# Patient Record
Sex: MALE | Race: WHITE | NOT HISPANIC OR LATINO | Employment: FULL TIME | URBAN - METROPOLITAN AREA
[De-identification: names, ages, dates, MRNs, and addresses within clinical notes are randomized per-mention and may not be internally consistent; named-entity substitution may affect disease eponyms.]

---

## 2018-02-08 PROBLEM — M67.911 ROTATOR CUFF DISORDER, RIGHT: Status: ACTIVE | Noted: 2018-02-08

## 2018-02-08 PROBLEM — Z87.828 HISTORY OF MENISCAL TEAR: Status: ACTIVE | Noted: 2018-02-08

## 2018-02-28 PROBLEM — N20.0 KIDNEY STONES: Status: ACTIVE | Noted: 2018-02-28

## 2018-04-17 PROBLEM — M75.01 ADHESIVE CAPSULITIS OF RIGHT SHOULDER: Status: ACTIVE | Noted: 2018-04-17

## 2018-05-08 PROBLEM — M54.12 CERVICAL RADICULITIS: Status: ACTIVE | Noted: 2018-05-08

## 2019-07-22 PROBLEM — M51.9 LUMBAR DISC DISEASE: Status: ACTIVE | Noted: 2019-07-22

## 2019-08-28 PROBLEM — R10.13 EPIGASTRIC PAIN: Status: ACTIVE | Noted: 2019-08-28

## 2019-10-03 PROBLEM — K21.9 GASTROESOPHAGEAL REFLUX DISEASE WITHOUT ESOPHAGITIS: Status: ACTIVE | Noted: 2019-10-03

## 2019-11-19 PROBLEM — Z72.0 CHEWS TOBACCO: Status: ACTIVE | Noted: 2019-11-19

## 2020-01-16 PROBLEM — K27.9 PUD (PEPTIC ULCER DISEASE): Status: ACTIVE | Noted: 2020-01-16

## 2020-01-16 PROBLEM — F41.9 ANXIETY: Status: ACTIVE | Noted: 2020-01-16

## 2020-03-16 PROBLEM — Z87.19 S/P DILATATION OF ESOPHAGEAL STRICTURE: Status: ACTIVE | Noted: 2020-03-16

## 2020-03-16 PROBLEM — Z98.890 S/P DILATATION OF ESOPHAGEAL STRICTURE: Status: ACTIVE | Noted: 2020-03-16

## 2020-04-16 PROBLEM — R63.4 WEIGHT LOSS: Status: ACTIVE | Noted: 2019-12-30

## 2020-04-16 PROBLEM — R68.81 EARLY SATIETY: Status: ACTIVE | Noted: 2019-12-30

## 2020-09-03 PROBLEM — F32.1 CURRENT MODERATE EPISODE OF MAJOR DEPRESSIVE DISORDER WITHOUT PRIOR EPISODE: Status: ACTIVE | Noted: 2020-09-03

## 2020-10-19 ENCOUNTER — TELEPHONE (OUTPATIENT)
Dept: ORTHOPEDICS | Facility: CLINIC | Age: 40
End: 2020-10-19

## 2020-10-22 ENCOUNTER — TELEPHONE (OUTPATIENT)
Dept: NEUROLOGY | Facility: CLINIC | Age: 40
End: 2020-10-22

## 2020-10-22 NOTE — TELEPHONE ENCOUNTER
----- Message from Ainsley Self MA sent at 10/21/2020  9:24 AM CDT -----  [facility]         Can ya'll assist with scheduling? Concussion     Thank you!   Berkley Piedra MS, OTC   Clinical Assistant to Dr. Bateman

## 2020-10-22 NOTE — TELEPHONE ENCOUNTER
----- Message from Ibis Gaspar sent at 10/22/2020 11:40 AM CDT -----  Contact: Pt @ [phone]  Pt calling in reference to missed call to schedule an appt as new patient for workers comp.

## 2021-05-12 ENCOUNTER — PATIENT MESSAGE (OUTPATIENT)
Dept: RESEARCH | Facility: HOSPITAL | Age: 41
End: 2021-05-12

## 2021-08-27 PROBLEM — M70.62 TROCHANTERIC BURSITIS OF LEFT HIP: Status: ACTIVE | Noted: 2021-08-27

## 2021-08-27 PROBLEM — G44.309 POST-CONCUSSION HEADACHE: Status: ACTIVE | Noted: 2021-08-27

## 2022-01-06 PROBLEM — U07.1 COVID-19 VIRUS INFECTION: Status: ACTIVE | Noted: 2022-01-06

## 2022-01-11 PROBLEM — M54.12 CERVICAL RADICULITIS: Status: RESOLVED | Noted: 2018-05-08 | Resolved: 2022-01-11

## 2022-01-11 PROBLEM — N20.0 KIDNEY STONES: Status: RESOLVED | Noted: 2018-02-28 | Resolved: 2022-01-11

## 2022-01-11 PROBLEM — K29.00 ACUTE SUPERFICIAL GASTRITIS WITHOUT HEMORRHAGE: Status: ACTIVE | Noted: 2021-04-19

## 2022-01-11 PROBLEM — M75.01 ADHESIVE CAPSULITIS OF RIGHT SHOULDER: Status: RESOLVED | Noted: 2018-04-17 | Resolved: 2022-01-11

## 2022-01-11 PROBLEM — Z72.0 CHEWS TOBACCO: Status: RESOLVED | Noted: 2019-11-19 | Resolved: 2022-01-11

## 2022-01-11 PROBLEM — M67.911 ROTATOR CUFF DISORDER, RIGHT: Status: RESOLVED | Noted: 2018-02-08 | Resolved: 2022-01-11

## 2022-01-11 PROBLEM — K29.00 ACUTE SUPERFICIAL GASTRITIS WITHOUT HEMORRHAGE: Status: RESOLVED | Noted: 2021-04-19 | Resolved: 2022-01-11

## 2022-01-11 PROBLEM — R68.81 EARLY SATIETY: Status: RESOLVED | Noted: 2019-12-30 | Resolved: 2022-01-11

## 2022-03-05 PROBLEM — R29.90 NEUROLOGICAL COMPLAINT: Status: ACTIVE | Noted: 2022-03-05

## 2022-03-05 PROBLEM — S06.9XAA TBI (TRAUMATIC BRAIN INJURY): Status: ACTIVE | Noted: 2022-03-05

## 2022-03-05 PROBLEM — F32.A DEPRESSION: Status: ACTIVE | Noted: 2022-03-05

## 2022-03-07 DIAGNOSIS — R55 SYNCOPE AND COLLAPSE: Primary | ICD-10-CM

## 2022-03-08 ENCOUNTER — TELEPHONE (OUTPATIENT)
Dept: CARDIOLOGY | Facility: CLINIC | Age: 42
End: 2022-03-08
Payer: MEDICAID

## 2022-03-21 ENCOUNTER — TELEPHONE (OUTPATIENT)
Dept: CARDIOLOGY | Facility: CLINIC | Age: 42
End: 2022-03-21
Payer: MEDICAID

## 2022-03-21 NOTE — TELEPHONE ENCOUNTER
----- Message from Renetta Hollins sent at 3/21/2022 12:01 PM CDT -----  Regarding: Call back  Contact: [phone]  Type:  Patient Call Back    Who Called:pt  What this is regarding?:reschedule appt for ekg  Would the patient rather a call back or a response via [facility]? Call back  Best Call Back Number:[phone]  Additional Information:     Advised to call back directly if there are further questions, or if these symptoms fail to improve as anticipated or worsen.

## 2022-05-23 ENCOUNTER — TELEPHONE (OUTPATIENT)
Dept: NEUROSURGERY | Facility: CLINIC | Age: 42
End: 2022-05-23
Payer: MEDICAID

## 2022-05-23 NOTE — TELEPHONE ENCOUNTER
Called patient to schedule NP appointment with Dr. Hinojosa per referral from Nader Natarajan II, MD.  No answer.  Left voicemail.

## 2022-09-24 ENCOUNTER — HOSPITAL ENCOUNTER (OUTPATIENT)
Dept: RADIOLOGY | Facility: CLINIC | Age: 42
Discharge: HOME | End: 2022-09-24
Payer: MEDICAID

## 2022-09-24 ENCOUNTER — OFFICE VISIT (OUTPATIENT)
Dept: URGENT CARE | Facility: CLINIC | Age: 42
End: 2022-09-24
Payer: MEDICAID

## 2022-09-24 VITALS
SYSTOLIC BLOOD PRESSURE: 124 MMHG | RESPIRATION RATE: 20 BRPM | OXYGEN SATURATION: 99 % | WEIGHT: 173 LBS | DIASTOLIC BLOOD PRESSURE: 83 MMHG | TEMPERATURE: 96 F | BODY MASS INDEX: 27.8 KG/M2 | HEART RATE: 68 BPM | HEIGHT: 66 IN

## 2022-09-24 DIAGNOSIS — S60.511A ABRASION OF RIGHT HAND, INITIAL ENCOUNTER: Primary | ICD-10-CM

## 2022-09-24 DIAGNOSIS — S60.511A ABRASION OF RIGHT HAND, INITIAL ENCOUNTER: ICD-10-CM

## 2022-09-24 PROCEDURE — 1159F PR MEDICATION LIST DOCUMENTED IN MEDICAL RECORD: ICD-10-PCS | Mod: CPTII,S$GLB,,

## 2022-09-24 PROCEDURE — 4010F PR ACE/ARB THEARPY RXD/TAKEN: ICD-10-PCS | Mod: CPTII,S$GLB,,

## 2022-09-24 PROCEDURE — 73130 X-RAY EXAM OF HAND: CPT | Mod: RT,S$GLB,, | Performed by: RADIOLOGY

## 2022-09-24 PROCEDURE — 99214 OFFICE O/P EST MOD 30 MIN: CPT | Mod: S$GLB,,,

## 2022-09-24 PROCEDURE — 3008F PR BODY MASS INDEX (BMI) DOCUMENTED: ICD-10-PCS | Mod: CPTII,S$GLB,,

## 2022-09-24 PROCEDURE — 3074F PR MOST RECENT SYSTOLIC BLOOD PRESSURE < 130 MM HG: ICD-10-PCS | Mod: CPTII,S$GLB,,

## 2022-09-24 PROCEDURE — 3074F SYST BP LT 130 MM HG: CPT | Mod: CPTII,S$GLB,,

## 2022-09-24 PROCEDURE — 3079F PR MOST RECENT DIASTOLIC BLOOD PRESSURE 80-89 MM HG: ICD-10-PCS | Mod: CPTII,S$GLB,,

## 2022-09-24 PROCEDURE — 73130 XR HAND COMPLETE 3 VIEW RIGHT: ICD-10-PCS | Mod: RT,S$GLB,, | Performed by: RADIOLOGY

## 2022-09-24 PROCEDURE — 4010F ACE/ARB THERAPY RXD/TAKEN: CPT | Mod: CPTII,S$GLB,,

## 2022-09-24 PROCEDURE — 3008F BODY MASS INDEX DOCD: CPT | Mod: CPTII,S$GLB,,

## 2022-09-24 PROCEDURE — 1159F MED LIST DOCD IN RCRD: CPT | Mod: CPTII,S$GLB,,

## 2022-09-24 PROCEDURE — 99214 PR OFFICE/OUTPT VISIT, EST, LEVL IV, 30-39 MIN: ICD-10-PCS | Mod: S$GLB,,,

## 2022-09-24 PROCEDURE — 3079F DIAST BP 80-89 MM HG: CPT | Mod: CPTII,S$GLB,,

## 2022-09-24 RX ORDER — CEPHALEXIN 500 MG/1
500 CAPSULE ORAL EVERY 12 HOURS
Qty: 14 CAPSULE | Refills: 0 | Status: SHIPPED | OUTPATIENT
Start: 2022-09-24 | End: 2022-10-01

## 2022-09-24 RX ORDER — NAPROXEN 500 MG/1
500 TABLET ORAL 2 TIMES DAILY
Qty: 10 TABLET | Refills: 0 | Status: SHIPPED | OUTPATIENT
Start: 2022-09-24 | End: 2022-09-24

## 2022-09-24 RX ORDER — TRAMADOL HYDROCHLORIDE 50 MG/1
50 TABLET ORAL EVERY 8 HOURS PRN
Qty: 9 TABLET | Refills: 0 | Status: SHIPPED | OUTPATIENT
Start: 2022-09-24 | End: 2022-09-27

## 2022-09-24 NOTE — PATIENT INSTRUCTIONS
Do not pick wound.    Take Keflex until completion and as prescribed.     Please follow up with primary care provider in 2-3 if this does not improve.     Please monitor for signs of infection include:  Increase in redness, increase in pain, purulent (pus) drainage.                                    If your condition worsens or fails to improve we recommend that you receive another evaluation at the ER immediately or contact your PCP to discuss your concerns or return here. You must understand that you've received an urgent care treatment only and that you may be released before all your medical problems are known or treated. You the patient will arrange for follouwp care as instructed.   Tylenol or ibuprofen can also be used as directed for pain unless you have an allergy to them or medical condition such as stomach ulcers, kidney or liver disease or blood thinners etc for which you should not be taking these type of medications.   
patient

## 2022-09-24 NOTE — PROGRESS NOTES
"Subjective:       Patient ID: Saul Guzman is a 42 y.o. male.    Vitals:  height is 5' 6" (1.676 m) and weight is 78.5 kg (173 lb). His temperature is 96.3 °F (35.7 °C). His blood pressure is 124/83 and his pulse is 68. His respiration is 20 and oxygen saturation is 99%.     Chief Complaint: Hand Injury    Cable wire hit on his hand and his middle finger. This happened about a week ago.  Patient reports was at home working and wire went into another right hand approximately 1 week ago.  Patient reports swelling has slight redness to site patient reports the numbness patient denies weakness tingling,    Hand Injury   His dominant hand is their right hand. The incident occurred 5 to 7 days ago. The incident occurred at work. The injury mechanism was a burn. The pain is present in the right fingers and right hand. The quality of the pain is described as stabbing. The pain does not radiate. The pain is at a severity of 10/10. The pain is severe. The pain has been Constant since the incident. Associated symptoms include numbness. Pertinent negatives include no chest pain, muscle weakness or tingling. Nothing aggravates the symptoms. He has tried acetaminophen for the symptoms.     Constitution: Negative. Negative for activity change, appetite change, chills, sweating, fatigue, fever and generalized weakness.   HENT: Negative.  Negative for ear pain.    Neck: neck negative. Negative for neck pain and painful lymph nodes.   Cardiovascular: Negative.  Negative for chest pain, leg swelling, palpitations, sob on exertion and passing out.   Respiratory: Negative.  Negative for cough and shortness of breath.    Gastrointestinal: Negative.  Negative for abdominal pain and bowel incontinence.   Musculoskeletal:  Positive for pain and trauma. Negative for joint pain, joint swelling, abnormal ROM of joint, arthritis, gout, back pain, pain with walking, muscle cramps, muscle ache and history of spine disorder.        Patient " reports right hand pain, and right middle finger pain with slight swelling redness.  Patient reported wire going into hand 1 week ago   Skin: Negative.  Negative for rash and hives.   Allergic/Immunologic: Negative for hives.   Neurological:  Positive for numbness. Negative for dizziness, light-headedness, passing out, facial drooping and altered mental status.   Hematologic/Lymphatic: Negative.  Negative for swollen lymph nodes.   Psychiatric/Behavioral: Negative.  Negative for altered mental status.      Objective:      Physical Exam   Constitutional: He is oriented to person, place, and time.  Non-toxic appearance. He does not appear ill. No distress.   HENT:   Head: Normocephalic and atraumatic.   Eyes: Extraocular movement intact   Cardiovascular: Normal rate, regular rhythm and normal pulses.   Pulmonary/Chest: Effort normal.   Abdominal: Normal appearance.   Musculoskeletal:      Right wrist: Normal. He exhibits normal range of motion, no tenderness, no bony tenderness, no swelling, no effusion, no crepitus, no deformity and no laceration.      Left wrist: Normal. He exhibits normal range of motion, no tenderness, no bony tenderness, no swelling, no effusion, no crepitus, no deformity and no laceration.      Right hand: Normal. He exhibits normal range of motion, no tenderness, no bony tenderness, normal two-point discrimination, normal capillary refill, no deformity, no laceration and no swelling. Normal sensation noted. Normal strength noted.      Left hand: He exhibits swelling. He exhibits normal range of motion, no tenderness, no bony tenderness, normal two-point discrimination, normal capillary refill, no deformity and no laceration. Normal sensation noted. Normal strength noted.      Comments: Approximately eraser size diameter swelling noted to pinpoint what appears to be puncture wound, with slight induration noted around puncture wound approximately eraser size in diameter, no fluctuance, area is  slightly red.  Patient with full range of motion to all joints in all 5 fingers .  Patient able to extend and flex against pressure to right 1st 2nd 3rd and 4th digits, radial and ulnar pulse present.  Cap refill less than 2 seconds, 3rd right digit MCP joint tender, slightly swollen patient with range of motion to this joint, no redness noted around joint.    Neurological: He is alert and oriented to person, place, and time.   Skin: Skin is dry and not diaphoretic.   Psychiatric: His behavior is normal. Mood normal.   Nursing note and vitals reviewed.    X-Ray Hand 3 View Right    Result Date: 9/24/2022  EXAMINATION: XR HAND COMPLETE 3 VIEW RIGHT CLINICAL HISTORY: Abrasion of right hand, initial encounter TECHNIQUE: PA, lateral, and oblique views of the right hand were performed. COMPARISON: None FINDINGS: A fracture of the carpals, metacarpals or phalanges is not seen.  Soft tissue swelling or foreign bodies are not seen.  Mild DJD is noted at the 1st metacarpophalangeal joint and interphalangeal joint of the thumb.  Juxta-articular bone erosion or Osteoporosis is not seen.  Subluxation at the wrist is not seen.     Mild DJD at the interphalangeal joint of the thumb and 1st metacarpophalangeal joint otherwise negative radiographs of the right hand. Electronically signed by: Enrique Herman MD Date:    09/24/2022 Time:    17:01     Assessment:       1. Abrasion of right hand, initial encounter            Plan:         Abrasion of right hand, initial encounter  -     X-Ray Hand 3 View Right; Future; Expected date: 09/24/2022           Medical Decision Making:   Initial Assessment:   PT in room AAOX4, skin W/D, resp E/U, non toxic in appearance, NAD.    Urgent Care Management:  Discussed x-ray and x-ray results with patient. Patient reports tetanus was up-to-date in tetanus Tetanus was 3 years ago.  Patient reports doing work at home and had a wire stick patient in hand approximately 1 week ago.  Discussed with  patient to monitor for signs and symptoms of infection patient given strict ED precautions.  Patient has a history of chronic stomach ulcers and was requesting medicine for pain patient be given tramadol discussed with patient to not operate heavy machinery or drive on this medication.  Discussed with patient to follow-up with primary care in next 2-3 days if this does not improve.  Discussed with patient I gave him Keflex to cover for infection and to take antibiotic as prescribed.  Discussed with patient if patient develops severe swelling,development of fever,   increase in redness, unable to move joint, to please go to emergency room.  Patient and wife agree with treatment plan and verbalized understanding patient ambulatory from clinic in no acute distress

## 2022-09-27 ENCOUNTER — TELEPHONE (OUTPATIENT)
Dept: URGENT CARE | Facility: CLINIC | Age: 42
End: 2022-09-27
Payer: MEDICAID

## 2023-02-28 ENCOUNTER — OCCUPATIONAL HEALTH (OUTPATIENT)
Dept: URGENT CARE | Facility: CLINIC | Age: 43
End: 2023-02-28

## 2023-02-28 DIAGNOSIS — Z02.1 PRE-EMPLOYMENT EXAMINATION: Primary | ICD-10-CM

## 2023-02-28 PROCEDURE — 99499 UNLISTED E&M SERVICE: CPT | Mod: S$GLB,,, | Performed by: PHYSICIAN ASSISTANT

## 2023-02-28 PROCEDURE — 99499 PHYSICAL, BASIC COMPLEXITY: ICD-10-PCS | Mod: S$GLB,,, | Performed by: PHYSICIAN ASSISTANT

## 2023-02-28 PROCEDURE — 72100 X-RAY EXAM L-S SPINE 2/3 VWS: CPT | Mod: S$GLB,,, | Performed by: RADIOLOGY

## 2023-02-28 PROCEDURE — 72100 XR LUMBAR SPINE 2 OR 3 VIEWS: ICD-10-PCS | Mod: S$GLB,,, | Performed by: RADIOLOGY
